# Patient Record
(demographics unavailable — no encounter records)

---

## 2024-12-11 NOTE — HISTORY OF PRESENT ILLNESS
[FreeTextEntry1] : Here for routine follow up. No new complaints. Some mild chronic stable VERMA. No CP.  Occas feels lightheaded after standing up quickly. Recent labs from PCP shown to me on his phone incl LDL 57.

## 2024-12-11 NOTE — PHYSICAL EXAM

## 2024-12-11 NOTE — DISCUSSION/SUMMARY
[FreeTextEntry1] : Sched carotid ultrasound to eval bruit and rule out stenosis. Same meds incl statin. Annual follow up.  [EKG obtained to assist in diagnosis and management of assessed problem(s)] : EKG obtained to assist in diagnosis and management of assessed problem(s)